# Patient Record
Sex: FEMALE | NOT HISPANIC OR LATINO | URBAN - METROPOLITAN AREA
[De-identification: names, ages, dates, MRNs, and addresses within clinical notes are randomized per-mention and may not be internally consistent; named-entity substitution may affect disease eponyms.]

---

## 2020-08-22 ENCOUNTER — TRANSCRIBE ORDERS (OUTPATIENT)
Dept: ADMINISTRATIVE | Facility: HOSPITAL | Age: 55
End: 2020-08-22

## 2020-08-22 DIAGNOSIS — R73.03 DIABETES MELLITUS, LATENT: ICD-10-CM

## 2020-08-22 DIAGNOSIS — Z12.11 SPECIAL SCREENING FOR MALIGNANT NEOPLASMS, COLON: Primary | ICD-10-CM

## 2020-08-22 DIAGNOSIS — Z13.220 SCREENING FOR LIPOID DISORDERS: ICD-10-CM

## 2023-09-25 ENCOUNTER — HOSPITAL ENCOUNTER (EMERGENCY)
Facility: HOSPITAL | Age: 58
Discharge: HOME | End: 2023-09-25
Attending: EMERGENCY MEDICINE
Payer: COMMERCIAL

## 2023-09-25 ENCOUNTER — APPOINTMENT (EMERGENCY)
Dept: CT IMAGING | Facility: HOSPITAL | Age: 58
End: 2023-09-25
Payer: COMMERCIAL

## 2023-09-25 VITALS
WEIGHT: 160 LBS | DIASTOLIC BLOOD PRESSURE: 64 MMHG | HEIGHT: 65 IN | HEART RATE: 72 BPM | RESPIRATION RATE: 20 BRPM | SYSTOLIC BLOOD PRESSURE: 130 MMHG | BODY MASS INDEX: 26.66 KG/M2 | TEMPERATURE: 98 F | OXYGEN SATURATION: 96 %

## 2023-09-25 DIAGNOSIS — M54.50 LOW BACK PAIN: ICD-10-CM

## 2023-09-25 DIAGNOSIS — N12 PYELONEPHRITIS: Primary | ICD-10-CM

## 2023-09-25 LAB
ALBUMIN SERPL BCP-MCNC: 4.1 G/DL (ref 3.5–5)
ALP SERPL-CCNC: 82 U/L (ref 34–104)
ALT SERPL W P-5'-P-CCNC: 23 U/L (ref 7–52)
ANION GAP SERPL CALCULATED.3IONS-SCNC: 7 MMOL/L
AST SERPL W P-5'-P-CCNC: 29 U/L (ref 13–39)
BACTERIA UR QL AUTO: ABNORMAL /HPF
BASOPHILS # BLD AUTO: 0.07 THOUSANDS/ÂΜL (ref 0–0.1)
BASOPHILS NFR BLD AUTO: 1 % (ref 0–1)
BILIRUB SERPL-MCNC: 0.51 MG/DL (ref 0.2–1)
BILIRUB UR QL STRIP: NEGATIVE
BUN SERPL-MCNC: 9 MG/DL (ref 5–25)
CALCIUM SERPL-MCNC: 9.4 MG/DL (ref 8.4–10.2)
CHLORIDE SERPL-SCNC: 109 MMOL/L (ref 96–108)
CLARITY UR: CLEAR
CO2 SERPL-SCNC: 22 MMOL/L (ref 21–32)
COLOR UR: YELLOW
CREAT SERPL-MCNC: 0.77 MG/DL (ref 0.6–1.3)
EOSINOPHIL # BLD AUTO: 0.07 THOUSAND/ÂΜL (ref 0–0.61)
EOSINOPHIL NFR BLD AUTO: 1 % (ref 0–6)
ERYTHROCYTE [DISTWIDTH] IN BLOOD BY AUTOMATED COUNT: 12.9 % (ref 11.6–15.1)
GFR SERPL CREATININE-BSD FRML MDRD: 84 ML/MIN/1.73SQ M
GLUCOSE SERPL-MCNC: 127 MG/DL (ref 65–140)
GLUCOSE UR STRIP-MCNC: NEGATIVE MG/DL
HCT VFR BLD AUTO: 37.5 % (ref 34.8–46.1)
HGB BLD-MCNC: 13.1 G/DL (ref 11.5–15.4)
HGB UR QL STRIP.AUTO: ABNORMAL
IMM GRANULOCYTES # BLD AUTO: 0.03 THOUSAND/UL (ref 0–0.2)
IMM GRANULOCYTES NFR BLD AUTO: 0 % (ref 0–2)
KETONES UR STRIP-MCNC: NEGATIVE MG/DL
LEUKOCYTE ESTERASE UR QL STRIP: ABNORMAL
LYMPHOCYTES # BLD AUTO: 1.49 THOUSANDS/ÂΜL (ref 0.6–4.47)
LYMPHOCYTES NFR BLD AUTO: 18 % (ref 14–44)
MCH RBC QN AUTO: 31.3 PG (ref 26.8–34.3)
MCHC RBC AUTO-ENTMCNC: 34.9 G/DL (ref 31.4–37.4)
MCV RBC AUTO: 90 FL (ref 82–98)
MONOCYTES # BLD AUTO: 0.42 THOUSAND/ÂΜL (ref 0.17–1.22)
MONOCYTES NFR BLD AUTO: 5 % (ref 4–12)
NEUTROPHILS # BLD AUTO: 6.14 THOUSANDS/ÂΜL (ref 1.85–7.62)
NEUTS SEG NFR BLD AUTO: 75 % (ref 43–75)
NITRITE UR QL STRIP: NEGATIVE
NON-SQ EPI CELLS URNS QL MICRO: ABNORMAL /HPF
NRBC BLD AUTO-RTO: 0 /100 WBCS
PH UR STRIP.AUTO: 6 [PH]
PLATELET # BLD AUTO: 272 THOUSANDS/UL (ref 149–390)
PMV BLD AUTO: 10.5 FL (ref 8.9–12.7)
POTASSIUM SERPL-SCNC: 4.7 MMOL/L (ref 3.5–5.3)
PROT SERPL-MCNC: 7.4 G/DL (ref 6.4–8.4)
PROT UR STRIP-MCNC: NEGATIVE MG/DL
RBC # BLD AUTO: 4.19 MILLION/UL (ref 3.81–5.12)
RBC #/AREA URNS AUTO: ABNORMAL /HPF
SODIUM SERPL-SCNC: 138 MMOL/L (ref 135–147)
SP GR UR STRIP.AUTO: 1.02 (ref 1–1.03)
UROBILINOGEN UR STRIP-ACNC: <2 MG/DL
WBC # BLD AUTO: 8.22 THOUSAND/UL (ref 4.31–10.16)
WBC #/AREA URNS AUTO: ABNORMAL /HPF

## 2023-09-25 PROCEDURE — 96372 THER/PROPH/DIAG INJ SC/IM: CPT

## 2023-09-25 PROCEDURE — 74177 CT ABD & PELVIS W/CONTRAST: CPT

## 2023-09-25 PROCEDURE — 36415 COLL VENOUS BLD VENIPUNCTURE: CPT

## 2023-09-25 PROCEDURE — 85025 COMPLETE CBC W/AUTO DIFF WBC: CPT

## 2023-09-25 PROCEDURE — 99284 EMERGENCY DEPT VISIT MOD MDM: CPT | Performed by: EMERGENCY MEDICINE

## 2023-09-25 PROCEDURE — 80053 COMPREHEN METABOLIC PANEL: CPT

## 2023-09-25 PROCEDURE — 81001 URINALYSIS AUTO W/SCOPE: CPT

## 2023-09-25 PROCEDURE — 99283 EMERGENCY DEPT VISIT LOW MDM: CPT

## 2023-09-25 RX ORDER — CEFPODOXIME PROXETIL 200 MG/1
200 TABLET, FILM COATED ORAL 2 TIMES DAILY
Qty: 20 TABLET | Refills: 0 | Status: SHIPPED | OUTPATIENT
Start: 2023-09-25 | End: 2023-10-05

## 2023-09-25 RX ORDER — LIDOCAINE 50 MG/G
1 PATCH TOPICAL ONCE
Status: DISCONTINUED | OUTPATIENT
Start: 2023-09-25 | End: 2023-09-25 | Stop reason: HOSPADM

## 2023-09-25 RX ORDER — KETOROLAC TROMETHAMINE 30 MG/ML
15 INJECTION, SOLUTION INTRAMUSCULAR; INTRAVENOUS ONCE
Status: COMPLETED | OUTPATIENT
Start: 2023-09-25 | End: 2023-09-25

## 2023-09-25 RX ORDER — LIDOCAINE 50 MG/G
1 PATCH TOPICAL DAILY
Qty: 5 PATCH | Refills: 0 | Status: SHIPPED | OUTPATIENT
Start: 2023-09-25

## 2023-09-25 RX ORDER — METHOCARBAMOL 500 MG/1
500 TABLET, FILM COATED ORAL 2 TIMES DAILY
Qty: 10 TABLET | Refills: 0 | Status: SHIPPED | OUTPATIENT
Start: 2023-09-25

## 2023-09-25 RX ORDER — CEFPODOXIME PROXETIL 200 MG/1
200 TABLET, FILM COATED ORAL 2 TIMES DAILY WITH MEALS
Status: DISCONTINUED | OUTPATIENT
Start: 2023-09-25 | End: 2023-09-25 | Stop reason: HOSPADM

## 2023-09-25 RX ORDER — CEFPODOXIME PROXETIL 200 MG/1
200 TABLET, FILM COATED ORAL 2 TIMES DAILY WITH MEALS
Status: DISCONTINUED | OUTPATIENT
Start: 2023-09-25 | End: 2023-09-25

## 2023-09-25 RX ORDER — METHOCARBAMOL 500 MG/1
500 TABLET, FILM COATED ORAL ONCE
Status: COMPLETED | OUTPATIENT
Start: 2023-09-25 | End: 2023-09-25

## 2023-09-25 RX ADMIN — IOHEXOL 100 ML: 350 INJECTION, SOLUTION INTRAVENOUS at 12:07

## 2023-09-25 RX ADMIN — LIDOCAINE 1 PATCH: 50 PATCH TOPICAL at 10:00

## 2023-09-25 RX ADMIN — METHOCARBAMOL 500 MG: 500 TABLET ORAL at 10:00

## 2023-09-25 RX ADMIN — CEFPODOXIME PROXETIL 200 MG: 200 TABLET, FILM COATED ORAL at 14:21

## 2023-09-25 RX ADMIN — KETOROLAC TROMETHAMINE 15 MG: 30 INJECTION, SOLUTION INTRAMUSCULAR; INTRAVENOUS at 10:00

## 2023-09-25 NOTE — ED PROVIDER NOTES
History  Chief Complaint   Patient presents with   • Back Pain     Pt presents with chronic back pain but today pain is worse then normal.  Left sided back pain that radiates down into her butt. Denies any n/v/d or urinary symptoms. Patient is a 69-year-old female with a past medical history of chronic left back pain, migraine and prediabetes presented to the emergency department for evaluation of left low back pain. Patient states the pain today is worse than it had been. Patient states the pain began last night. Patient states it does radiate down near her buttock but does not radiate into her buttock or down her leg. Patient states the pain is worse when she moves. Patient states she has tried Excedrin without alleviation. Patient denies any radiation around the abdomen or urinary symptoms. Denies fevers, chills, rash, headache, weakness, dizziness, visual changes, abdominal pain, nausea, vomiting, diarrhea, constipation, chest pain, shortness of breath or difficulty breathing. Does not offer any other concerns or complaints. None       History reviewed. No pertinent past medical history. History reviewed. No pertinent surgical history. History reviewed. No pertinent family history. I have reviewed and agree with the history as documented. E-Cigarette/Vaping   • E-Cigarette Use Never User      E-Cigarette/Vaping Substances     Social History     Tobacco Use   • Smoking status: Never   • Smokeless tobacco: Never   Vaping Use   • Vaping Use: Never used   Substance Use Topics   • Alcohol use: Never   • Drug use: Not Currently       Review of Systems   Constitutional: Negative for chills and fever. HENT: Negative for ear pain and sore throat. Eyes: Negative for pain and visual disturbance. Respiratory: Negative for cough and shortness of breath. Cardiovascular: Negative for chest pain and palpitations. Gastrointestinal: Negative for abdominal pain and vomiting. Genitourinary: Negative for dysuria and hematuria. Musculoskeletal: Positive for back pain. Negative for arthralgias. Skin: Negative for color change and rash. Neurological: Negative for seizures and syncope. All other systems reviewed and are negative. Physical Exam  Physical Exam  Vitals and nursing note reviewed. Constitutional:       General: She is not in acute distress. Appearance: Normal appearance. She is well-developed. She is not toxic-appearing or diaphoretic. HENT:      Head: Normocephalic and atraumatic. Right Ear: External ear normal.      Left Ear: External ear normal.      Nose: Nose normal.      Mouth/Throat:      Mouth: Mucous membranes are moist.   Eyes:      General: No scleral icterus. Right eye: No discharge. Left eye: No discharge. Conjunctiva/sclera: Conjunctivae normal.   Cardiovascular:      Rate and Rhythm: Normal rate and regular rhythm. Heart sounds: No murmur heard. Pulmonary:      Effort: Pulmonary effort is normal. No respiratory distress. Breath sounds: Normal breath sounds. Abdominal:      Palpations: Abdomen is soft. Tenderness: There is no abdominal tenderness. There is right CVA tenderness. Musculoskeletal:         General: No swelling, deformity or signs of injury. Normal range of motion. Cervical back: Normal range of motion and neck supple. No rigidity. Back:       Comments: No midline tenderness, step-offs or deformities. Skin:     General: Skin is warm and dry. Capillary Refill: Capillary refill takes less than 2 seconds. Coloration: Skin is not jaundiced. Findings: No erythema or rash. Neurological:      General: No focal deficit present. Mental Status: She is alert and oriented to person, place, and time. Mental status is at baseline. Cranial Nerves: No cranial nerve deficit.       Gait: Gait normal.   Psychiatric:         Mood and Affect: Mood normal. Behavior: Behavior normal.         Thought Content:  Thought content normal.         Judgment: Judgment normal.         Vital Signs  ED Triage Vitals   Temperature Pulse Respirations Blood Pressure SpO2   09/25/23 0942 09/25/23 0942 09/25/23 0942 09/25/23 0942 09/25/23 0942   (!) 97.4 °F (36.3 °C) (!) 132 22 125/88 100 %      Temp Source Heart Rate Source Patient Position - Orthostatic VS BP Location FiO2 (%)   09/25/23 0942 09/25/23 0942 09/25/23 0942 09/25/23 0942 --   Temporal Monitor Sitting Right arm       Pain Score       09/25/23 1000       10 - Worst Possible Pain           Vitals:    09/25/23 1239 09/25/23 1300 09/25/23 1400 09/25/23 1422   BP: 117/67 139/75 132/60 130/64   Pulse: 68 71 76 72   Patient Position - Orthostatic VS: Lying  Lying Sitting         Visual Acuity      ED Medications  Medications   methocarbamol (ROBAXIN) tablet 500 mg (500 mg Oral Given 9/25/23 1000)   ketorolac (TORADOL) injection 15 mg (15 mg Intramuscular Given 9/25/23 1000)   iohexol (OMNIPAQUE) 350 MG/ML injection (MULTI-DOSE) 100 mL (100 mL Intravenous Given 9/25/23 1207)       Diagnostic Studies  Results Reviewed     Procedure Component Value Units Date/Time    Comprehensive metabolic panel [[id number]]  (Abnormal) Collected: 09/25/23 1120    Lab Status: Final result Specimen: Blood from Arm, Right Updated: 09/25/23 1155     Sodium 138 mmol/L      Potassium 4.7 mmol/L      Chloride 109 mmol/L      CO2 22 mmol/L      ANION GAP 7 mmol/L      BUN 9 mg/dL      Creatinine 0.77 mg/dL      Glucose 127 mg/dL      Calcium 9.4 mg/dL      AST 29 U/L      ALT 23 U/L      Alkaline Phosphatase 82 U/L      Total Protein 7.4 g/dL      Albumin 4.1 g/dL      Total Bilirubin 0.51 mg/dL      eGFR 84 ml/min/1.73sq m     Narrative:      [facility] (CKD):   •  Stage 1 with normal or high GFR (GFR > 90 mL/min/1.73 square meters)  •  Stage 2 Mild CKD (GFR = 60-89 mL/min/1.73 square meters)  •  Stage 3A Moderate CKD (GFR = 45-59 mL/min/1.73 square meters)  •  Stage 3B Moderate CKD (GFR = 30-44 mL/min/1.73 square meters)  •  Stage 4 Severe CKD (GFR = 15-29 mL/min/1.73 square meters)  •  Stage 5 End Stage CKD (GFR <15 mL/min/1.73 square meters)  Note: GFR calculation is accurate only with a steady state creatinine    CBC and differential [[id number]] Collected: 09/25/23 1120    Lab Status: Final result Specimen: Blood from Arm, Right Updated: 09/25/23 1126     WBC 8.22 Thousand/uL      RBC 4.19 Million/uL      Hemoglobin 13.1 g/dL      Hematocrit 37.5 %      MCV 90 fL      MCH 31.3 pg      MCHC 34.9 g/dL      RDW 12.9 %      MPV 10.5 fL      Platelets 986 Thousands/uL      nRBC 0 /100 WBCs      Neutrophils Relative 75 %      Immat GRANS % 0 %      Lymphocytes Relative 18 %      Monocytes Relative 5 %      Eosinophils Relative 1 %      Basophils Relative 1 %      Neutrophils Absolute 6.14 Thousands/µL      Immature Grans Absolute 0.03 Thousand/uL      Lymphocytes Absolute 1.49 Thousands/µL      Monocytes Absolute 0.42 Thousand/µL      Eosinophils Absolute 0.07 Thousand/µL      Basophils Absolute 0.07 Thousands/µL     Urine Microscopic [[id number]]  (Abnormal) Collected: 09/25/23 1037    Lab Status: Final result Specimen: Urine, Clean Catch Updated: 09/25/23 1108     RBC, UA 2-4 /hpf      WBC, UA 4-10 /hpf      Epithelial Cells Occasional /hpf      Bacteria, UA Occasional /hpf     UA w Reflex to Microscopic w Reflex to Culture [[id number]]  (Abnormal) Collected: 09/25/23 1037    Lab Status: Final result Specimen: Urine, Clean Catch Updated: 09/25/23 1055     Color, UA Yellow     Clarity, UA Clear     Specific Gravity, UA 1.020     pH, UA 6.0     Leukocytes, UA Moderate     Nitrite, UA Negative     Protein, UA Negative mg/dl      Glucose, UA Negative mg/dl      Ketones, UA Negative mg/dl      Urobilinogen, UA <2.0 mg/dl      Bilirubin, UA Negative     Occult Blood, UA Trace                 CT abdomen pelvis with contrast   Final Result by Lissette Reyes MD (09/25 9155)   No obstructing calculus   No bowel obstruction   Mild prominence of the renal pelvises and ureters without any obstructing calculus            Workstation performed: [id number]         CT recon only lumbar spine   Final Result by Lissette Reyes MD (09/25 6764)      No acute compression collapse of the vetebra   Broad-based central protrusion at L4-5 with facet joint disease and moderate central canal narrowing. Disc bulge at L3-4 with the right subarticular recess protrusion with impingement of the traversing right L4 nerve root and moderate central canal narrowing. Workstation performed: [id number]                    Procedures  Procedures         ED Course             SBIRT 22yo+    Flowsheet Row Most Recent Value   Initial Alcohol Screen: US AUDIT-C     1. How often do you have a drink containing alcohol? 0 Filed at: 09/25/2023 1234   2. How many drinks containing alcohol do you have on a typical day you are drinking? 0 Filed at: 09/25/2023 1234   3b. FEMALE Any Age, or MALE 65+: How often do you have 4 or more drinks on one occassion? 0 Filed at: 09/25/2023 1234   Audit-C Score 0 Filed at: 09/25/2023 1234   MYRTLE: How many times in the past year have you. .. Used an illegal drug or used a prescription medication for non-medical reasons? Never Filed at: 09/25/2023 1234                    Medical Decision Making    This is a 44-year-old female present to the emergency department for evaluation of back pain. Patient states having chronic left-sided back pain but states this is worse than her chronic pain. Patient denies any recent change in activity or trauma to the area. Patient denies any abdominal pain or urinary symptoms. Patient states the pain is worse when she moves. Denies bladder or bowel habit changes, bladder or bowel incontinence, or saddle anesthesia.   Patient does appear uncomfortable on initial examination, stable vital signs and in no acute distress. Differential diagnosis to include but is not limited to: Musculoskeletal strain, sprain, UTI, pyelonephritis    Initial ED Plan: imaging, labs, UA    ED results:  No obstructing calculus  No bowel obstruction  Mild prominence of the renal pelvises and ureters without any obstructing calculus    No acute compression collapse of the vetebra  Broad-based central protrusion at L4-5 with facet joint disease and moderate central canal narrowing. Disc bulge at L3-4 with the right subarticular recess protrusion with impingement of the traversing right L4 nerve root and moderate central canal narrowing.  -Discussed the above CT scan results with the patient. Patient states she knew about the protrusions and disc bulges. Final ED assessment: Patient is stable and well appearing. Discussed radiologic studies and laboratory results. Discussed antibiotic course for possible pyelonephritis secondary to the location of the pain and the RBCs and WBCs in UA. Discussed follow-up with PCP and orthopedics. Strict return precautions were discussed including but not limited to worsening pain, urinary symptoms, fevers, abdominal pain. Patient verbalized understanding and is agreeable with the plan for discharge. Amount and/or Complexity of Data Reviewed  Labs: ordered. Radiology: ordered. Risk  Prescription drug management. Disposition  Final diagnoses:   Pyelonephritis   Low back pain     Time reflects when diagnosis was documented in both MDM as applicable and the Disposition within this note     Time User Action Codes Description Comment    9/25/2023  1:58 PM Delos Harvey Add [N12] Pyelonephritis     9/25/2023  1:58 PM Delos Harvey Add [M54.50] Low back pain       ED Disposition     ED Disposition   Discharge    Condition   Stable    Date/Time   Mon Sep 25, 2023  1:58 PM    Comment   Olu Blackman discharge to home/self care. Follow-up Information     Follow up With Specialties Details Why Contact Info Additional Information    your PCP  Call in 3 days For follow up      [facility] Go to  If symptoms worsen [address] [facility], [place], [place], [address] [facility] Physical Therapy Call  For follow up           Discharge Medication List as of 9/25/2023  2:02 PM      START taking these medications    Details   cefpodoxime (VANTIN) 200 mg tablet Take 1 tablet (200 mg total) by mouth 2 (two) times a day for 10 days, Starting Mon 9/25/2023, Until Thu 10/5/2023, Normal                 PDMP Review     None          ED Provider  Electronically Signed by           Martinez Lo PA-C  09/26/23 5920

## 2023-09-25 NOTE — DISCHARGE INSTRUCTIONS
Follow up with PCP  Follow up with comprehensive spine  Return to the ED with new or worsening symptoms including but not limited to worsening pain, urinary symptoms, bladder or bowel habit changes, bladder or bowel incontinence, or saddle anesthesia. No acute compression collapse of the vetebra  Broad-based central protrusion at L4-5 with facet joint disease and moderate central canal narrowing. Disc bulge at L3-4 with the right subarticular recess protrusion with impingement of the traversing right L4 nerve root and moderate central canal narrowing.

## 2023-09-27 ENCOUNTER — TELEPHONE (OUTPATIENT)
Dept: PHYSICAL THERAPY | Facility: OTHER | Age: 58
End: 2023-09-27

## 2023-09-27 NOTE — TELEPHONE ENCOUNTER
Call placed to the patient per [facility] referral.    Patient's  Elton Olson answered the call. I left message with  for patient to call back. Phone number and hours of business provided. (no detail were given) No communication consent in epic. This is the 1st attempt to reach the patient. Will defer referral per protocol.

## 2023-10-01 ENCOUNTER — APPOINTMENT (EMERGENCY)
Dept: CT IMAGING | Facility: HOSPITAL | Age: 58
End: 2023-10-01
Payer: COMMERCIAL

## 2023-10-01 ENCOUNTER — HOSPITAL ENCOUNTER (EMERGENCY)
Facility: HOSPITAL | Age: 58
Discharge: HOME | End: 2023-10-01
Attending: EMERGENCY MEDICINE | Admitting: EMERGENCY MEDICINE
Payer: COMMERCIAL

## 2023-10-01 VITALS
HEART RATE: 63 BPM | WEIGHT: 160 LBS | TEMPERATURE: 97.6 F | BODY MASS INDEX: 26.66 KG/M2 | DIASTOLIC BLOOD PRESSURE: 62 MMHG | HEIGHT: 65 IN | RESPIRATION RATE: 16 BRPM | OXYGEN SATURATION: 100 % | SYSTOLIC BLOOD PRESSURE: 122 MMHG

## 2023-10-01 DIAGNOSIS — S39.012A LOW BACK STRAIN, INITIAL ENCOUNTER: Primary | ICD-10-CM

## 2023-10-01 DIAGNOSIS — M54.6 LEFT-SIDED THORACIC BACK PAIN: ICD-10-CM

## 2023-10-01 LAB
ALBUMIN SERPL BCP-MCNC: 4.7 G/DL (ref 3.5–5)
ALP SERPL-CCNC: 99 U/L (ref 34–104)
ALT SERPL W P-5'-P-CCNC: 19 U/L (ref 7–52)
ANION GAP SERPL CALCULATED.3IONS-SCNC: 11 MMOL/L
AST SERPL W P-5'-P-CCNC: 18 U/L (ref 13–39)
BACTERIA UR QL AUTO: ABNORMAL /HPF
BASOPHILS # BLD AUTO: 0.07 THOUSANDS/ÂΜL (ref 0–0.1)
BASOPHILS NFR BLD AUTO: 1 % (ref 0–1)
BILIRUB DIRECT SERPL-MCNC: 0.08 MG/DL (ref 0–0.2)
BILIRUB SERPL-MCNC: 0.42 MG/DL (ref 0.2–1)
BILIRUB UR QL STRIP: NEGATIVE
BUN SERPL-MCNC: 12 MG/DL (ref 5–25)
CALCIUM SERPL-MCNC: 9.8 MG/DL (ref 8.4–10.2)
CHLORIDE SERPL-SCNC: 108 MMOL/L (ref 96–108)
CLARITY UR: CLEAR
CO2 SERPL-SCNC: 22 MMOL/L (ref 21–32)
COLOR UR: COLORLESS
CREAT SERPL-MCNC: 0.79 MG/DL (ref 0.6–1.3)
EOSINOPHIL # BLD AUTO: 0.05 THOUSAND/ÂΜL (ref 0–0.61)
EOSINOPHIL NFR BLD AUTO: 1 % (ref 0–6)
ERYTHROCYTE [DISTWIDTH] IN BLOOD BY AUTOMATED COUNT: 12.8 % (ref 11.6–15.1)
GFR SERPL CREATININE-BSD FRML MDRD: 82 ML/MIN/1.73SQ M
GLUCOSE SERPL-MCNC: 93 MG/DL (ref 65–140)
GLUCOSE UR STRIP-MCNC: NEGATIVE MG/DL
HCT VFR BLD AUTO: 42.7 % (ref 34.8–46.1)
HGB BLD-MCNC: 14.5 G/DL (ref 11.5–15.4)
HGB UR QL STRIP.AUTO: NEGATIVE
IMM GRANULOCYTES # BLD AUTO: 0.01 THOUSAND/UL (ref 0–0.2)
IMM GRANULOCYTES NFR BLD AUTO: 0 % (ref 0–2)
KETONES UR STRIP-MCNC: NEGATIVE MG/DL
LEUKOCYTE ESTERASE UR QL STRIP: ABNORMAL
LIPASE SERPL-CCNC: 34 U/L (ref 11–82)
LYMPHOCYTES # BLD AUTO: 2.87 THOUSANDS/ÂΜL (ref 0.6–4.47)
LYMPHOCYTES NFR BLD AUTO: 29 % (ref 14–44)
MCH RBC QN AUTO: 31.4 PG (ref 26.8–34.3)
MCHC RBC AUTO-ENTMCNC: 34 G/DL (ref 31.4–37.4)
MCV RBC AUTO: 92 FL (ref 82–98)
MONOCYTES # BLD AUTO: 0.64 THOUSAND/ÂΜL (ref 0.17–1.22)
MONOCYTES NFR BLD AUTO: 7 % (ref 4–12)
NEUTROPHILS # BLD AUTO: 6.14 THOUSANDS/ÂΜL (ref 1.85–7.62)
NEUTS SEG NFR BLD AUTO: 62 % (ref 43–75)
NITRITE UR QL STRIP: NEGATIVE
NON-SQ EPI CELLS URNS QL MICRO: ABNORMAL /HPF
NRBC BLD AUTO-RTO: 0 /100 WBCS
PH UR STRIP.AUTO: 7.5 [PH]
PLATELET # BLD AUTO: 287 THOUSANDS/UL (ref 149–390)
PMV BLD AUTO: 10.6 FL (ref 8.9–12.7)
POTASSIUM SERPL-SCNC: 3.7 MMOL/L (ref 3.5–5.3)
PROT SERPL-MCNC: 8.7 G/DL (ref 6.4–8.4)
PROT UR STRIP-MCNC: NEGATIVE MG/DL
RBC # BLD AUTO: 4.62 MILLION/UL (ref 3.81–5.12)
RBC #/AREA URNS AUTO: ABNORMAL /HPF
SODIUM SERPL-SCNC: 141 MMOL/L (ref 135–147)
SP GR UR STRIP.AUTO: 1 (ref 1–1.03)
UROBILINOGEN UR STRIP-ACNC: <2 MG/DL
WBC # BLD AUTO: 9.78 THOUSAND/UL (ref 4.31–10.16)
WBC #/AREA URNS AUTO: ABNORMAL /HPF

## 2023-10-01 PROCEDURE — 74177 CT ABD & PELVIS W/CONTRAST: CPT

## 2023-10-01 PROCEDURE — 80048 BASIC METABOLIC PNL TOTAL CA: CPT | Performed by: EMERGENCY MEDICINE

## 2023-10-01 PROCEDURE — 99285 EMERGENCY DEPT VISIT HI MDM: CPT | Performed by: EMERGENCY MEDICINE

## 2023-10-01 PROCEDURE — 99283 EMERGENCY DEPT VISIT LOW MDM: CPT

## 2023-10-01 PROCEDURE — 96374 THER/PROPH/DIAG INJ IV PUSH: CPT

## 2023-10-01 PROCEDURE — 96372 THER/PROPH/DIAG INJ SC/IM: CPT

## 2023-10-01 PROCEDURE — 81001 URINALYSIS AUTO W/SCOPE: CPT | Performed by: EMERGENCY MEDICINE

## 2023-10-01 PROCEDURE — 36415 COLL VENOUS BLD VENIPUNCTURE: CPT | Performed by: EMERGENCY MEDICINE

## 2023-10-01 PROCEDURE — 83690 ASSAY OF LIPASE: CPT | Performed by: EMERGENCY MEDICINE

## 2023-10-01 PROCEDURE — 85025 COMPLETE CBC W/AUTO DIFF WBC: CPT | Performed by: EMERGENCY MEDICINE

## 2023-10-01 PROCEDURE — 80076 HEPATIC FUNCTION PANEL: CPT | Performed by: EMERGENCY MEDICINE

## 2023-10-01 RX ORDER — CYCLOBENZAPRINE HCL 10 MG
10 TABLET ORAL 2 TIMES DAILY PRN
Qty: 20 TABLET | Refills: 0 | Status: CANCELLED | OUTPATIENT
Start: 2023-10-01

## 2023-10-01 RX ORDER — NAPROXEN 500 MG/1
500 TABLET ORAL EVERY 12 HOURS PRN
Qty: 20 TABLET | Refills: 0 | Status: SHIPPED | OUTPATIENT
Start: 2023-10-01

## 2023-10-01 RX ORDER — LIDOCAINE 50 MG/G
1 PATCH TOPICAL ONCE
Status: DISCONTINUED | OUTPATIENT
Start: 2023-10-01 | End: 2023-10-01 | Stop reason: HOSPADM

## 2023-10-01 RX ORDER — ACETAMINOPHEN 325 MG/1
650 TABLET ORAL ONCE
Status: COMPLETED | OUTPATIENT
Start: 2023-10-01 | End: 2023-10-01

## 2023-10-01 RX ORDER — METHOCARBAMOL 500 MG/1
500 TABLET, FILM COATED ORAL ONCE
Status: DISCONTINUED | OUTPATIENT
Start: 2023-10-01 | End: 2023-10-01

## 2023-10-01 RX ORDER — MORPHINE SULFATE 4 MG/ML
4 INJECTION, SOLUTION INTRAMUSCULAR; INTRAVENOUS ONCE
Status: COMPLETED | OUTPATIENT
Start: 2023-10-01 | End: 2023-10-01

## 2023-10-01 RX ORDER — KETOROLAC TROMETHAMINE 30 MG/ML
30 INJECTION, SOLUTION INTRAMUSCULAR; INTRAVENOUS ONCE
Status: COMPLETED | OUTPATIENT
Start: 2023-10-01 | End: 2023-10-01

## 2023-10-01 RX ORDER — CYCLOBENZAPRINE HCL 10 MG
10 TABLET ORAL ONCE
Status: COMPLETED | OUTPATIENT
Start: 2023-10-01 | End: 2023-10-01

## 2023-10-01 RX ADMIN — ACETAMINOPHEN 650 MG: 325 TABLET, FILM COATED ORAL at 15:33

## 2023-10-01 RX ADMIN — LIDOCAINE 1 PATCH: 50 PATCH CUTANEOUS at 15:34

## 2023-10-01 RX ADMIN — IOHEXOL 100 ML: 350 INJECTION, SOLUTION INTRAVENOUS at 18:48

## 2023-10-01 RX ADMIN — CYCLOBENZAPRINE HYDROCHLORIDE 10 MG: 10 TABLET, FILM COATED ORAL at 15:33

## 2023-10-01 RX ADMIN — KETOROLAC TROMETHAMINE 30 MG: 30 INJECTION INTRAMUSCULAR; INTRAVENOUS at 15:34

## 2023-10-01 RX ADMIN — MORPHINE SULFATE 4 MG: 4 INJECTION INTRAVENOUS at 17:57

## 2023-10-01 NOTE — ED PROVIDER NOTES
History  Chief Complaint   Patient presents with   • Back Pain     Patient c/o lower back pain since last Monday, previously seen here for the same thing. Patient is a 58-year-old female with no significant past medical or surgical history, presents to the emergency department complaining of left mid to low back pain. Patient states her pain started on Monday. Patient reports she does have chronic back pain but this pain feels different. She states she was evaluated in the ED on Monday 9/25 and according to medical records, she had CBC, CMP both of which were unremarkable. UA showed 4-10 WBCs, 2-4 RBCs, occasional epithelial cells and occasional bacteria. She had CT abdomen and pelvis which showed "Mild prominence of the renal pelvises and ureters without any obstructing calculus" but no other acute abnormality. CT lumbar spine recon imaging was also done on 9/25 showing the following: "No acute compression collapse of the vetebrae. Broad-based central protrusion at L4-5 with facet joint disease and moderate central canal narrowing. Disc bulge at L3-4 with the right subarticular recess protrusion with impingement of the traversing right L4 nerve root and moderate central canal narrowing."  She was sent home with antibiotic to cover for pyelonephritis as well as a muscle relaxer but she states the muscle relaxer has not been helping. She has a few days left of the antibiotic which was given to her for a total of 10 days. She states her pain was getting better and she felt good yesterday but it got acutely worse again today. She admits to resuming normal daily activities yesterday and was walking and doing chores. No known injury. She denies any radiation of the pain and localizes it to the left flank region.   She denies any associated fevers or chills, headache, dizziness or near syncope, neck pain or stiffness, cough, URI symptoms, chest pain, shortness of breath, palpitations, abdominal pain or distention, nausea, vomiting, change in bowel habits, blood per rectum or melena, dysuria, change in urinary frequency, gross hematuria, skin rash or color change, extremity weakness or paresthesia, extremity swelling or pain, other focal neurologic deficits. History provided by:  Patient and medical records   used: No    Back Pain  Associated symptoms: no abdominal pain, no chest pain, no dysuria, no fever, no headaches, no numbness and no weakness        Prior to Admission Medications   Prescriptions Last Dose Informant Patient Reported? Taking? cefpodoxime (VANTIN) 200 mg tablet   No No   Sig: Take 1 tablet (200 mg total) by mouth 2 (two) times a day for 10 days   lidocaine (Lidoderm) 5 %   No No   Sig: Apply 1 patch topically over 12 hours daily Remove & Discard patch within 12 hours or as directed by MD   methocarbamol (ROBAXIN) 500 mg tablet   No No   Sig: Take 1 tablet (500 mg total) by mouth 2 (two) times a day      Facility-Administered Medications: None       No past medical history on file. No past surgical history on file. No family history on file. I have reviewed and agree with the history as documented. E-Cigarette/Vaping   • E-Cigarette Use Never User      E-Cigarette/Vaping Substances     Social History     Tobacco Use   • Smoking status: Never   • Smokeless tobacco: Never   Vaping Use   • Vaping Use: Never used   Substance Use Topics   • Alcohol use: Never   • Drug use: Not Currently       Review of Systems   Constitutional: Negative for chills and fever. HENT: Negative for congestion, ear pain, rhinorrhea and sore throat. Respiratory: Negative for cough, chest tightness, shortness of breath and wheezing. Cardiovascular: Negative for chest pain and palpitations. Gastrointestinal: Negative for abdominal pain, constipation, diarrhea, nausea and vomiting. Genitourinary: Positive for flank pain.  Negative for difficulty urinating, dysuria, frequency and hematuria. Musculoskeletal: Positive for back pain. Negative for neck pain and neck stiffness. Skin: Negative for color change, pallor, rash and wound. Allergic/Immunologic: Negative for immunocompromised state. Neurological: Negative for dizziness, syncope, weakness, light-headedness, numbness and headaches. Hematological: Negative for adenopathy. Does not bruise/bleed easily. Psychiatric/Behavioral: Negative for confusion and decreased concentration. All other systems reviewed and are negative. Physical Exam  Physical Exam  Vitals and nursing note reviewed. Constitutional:       General: She is not in acute distress. Appearance: Normal appearance. She is well-developed. She is not ill-appearing, toxic-appearing or diaphoretic. HENT:      Head: Normocephalic and atraumatic. Right Ear: External ear normal.      Left Ear: External ear normal.      Mouth/Throat:      Mouth: Mucous membranes are moist.      Pharynx: Oropharynx is clear. Eyes:      Extraocular Movements: Extraocular movements intact. Conjunctiva/sclera: Conjunctivae normal.   Neck:      Vascular: No JVD. Cardiovascular:      Rate and Rhythm: Normal rate and regular rhythm. Pulses: Normal pulses. Heart sounds: Normal heart sounds. No murmur heard. No friction rub. No gallop. Pulmonary:      Effort: Pulmonary effort is normal. No respiratory distress. Breath sounds: Normal breath sounds. No wheezing, rhonchi or rales. Abdominal:      General: There is no distension. Palpations: Abdomen is soft. Tenderness: There is no abdominal tenderness. There is no right CVA tenderness, left CVA tenderness, guarding or rebound. Musculoskeletal:         General: Tenderness present. No swelling. Normal range of motion. Cervical back: Normal range of motion and neck supple. No rigidity. Comments: +Left thoracolumbar paravertebral muscle tenderness and hypertonicity.   No midline cervical, thoracic or lumbar spine pain. No overlying skin changes on the back. Skin:     General: Skin is warm and dry. Coloration: Skin is not pale. Findings: No erythema or rash. Neurological:      General: No focal deficit present. Mental Status: She is alert and oriented to person, place, and time. Sensory: No sensory deficit. Motor: No weakness.    Psychiatric:         Mood and Affect: Mood normal.         Behavior: Behavior normal.         Vital Signs  ED Triage Vitals [10/01/23 1413]   Temperature Pulse Respirations Blood Pressure SpO2   97.6 °F (36.4 °C) (!) 110 20 (!) 185/72 100 %      Temp Source Heart Rate Source Patient Position - Orthostatic VS BP Location FiO2 (%)   Temporal Monitor Sitting Left arm --      Pain Score       --         Vitals:    10/01/23 1413 10/01/23 1500 10/01/23 1925   BP: (!) 185/72 106/53 122/62   BP Location: Left arm Left arm Right arm   Pulse: (!) 110 75 63   Resp: 20 20 16   Temp: 97.6 °F (36.4 °C)     TempSrc: Temporal     SpO2: 100% 99% 100%   Weight: 72.6 kg (160 lb)     Height: 5' 5" (1.651 m)         Visual Acuity      ED Medications  Medications   lidocaine (LIDODERM) 5 % patch 1 patch (1 patch Topical Medication Applied 10/1/23 1534)   ketorolac (TORADOL) injection 30 mg (30 mg Intramuscular Given 10/1/23 1534)   acetaminophen (TYLENOL) tablet 650 mg (650 mg Oral Given 10/1/23 1533)   cyclobenzaprine (FLEXERIL) tablet 10 mg (10 mg Oral Given 10/1/23 1533)   morphine injection 4 mg (4 mg Intravenous Given 10/1/23 1757)   iohexol (OMNIPAQUE) 350 MG/ML injection (MULTI-DOSE) 100 mL (100 mL Intravenous Given 10/1/23 1848)       Diagnostic Studies  Results Reviewed     Procedure Component Value Units Date/Time    Basic metabolic panel [[id number]] Collected: 10/01/23 1755    Lab Status: Final result Specimen: Blood from Arm, Left Updated: 10/01/23 1816     Sodium 141 mmol/L      Potassium 3.7 mmol/L      Chloride 108 mmol/L      CO2 22 mmol/L      ANION GAP 11 mmol/L      BUN 12 mg/dL      Creatinine 0.79 mg/dL      Glucose 93 mg/dL      Calcium 9.8 mg/dL      eGFR 82 ml/min/1.73sq m     Narrative:      [facility] guidelines for Chronic Kidney Disease (CKD):   •  Stage 1 with normal or high GFR (GFR > 90 mL/min/1.73 square meters)  •  Stage 2 Mild CKD (GFR = 60-89 mL/min/1.73 square meters)  •  Stage 3A Moderate CKD (GFR = 45-59 mL/min/1.73 square meters)  •  Stage 3B Moderate CKD (GFR = 30-44 mL/min/1.73 square meters)  •  Stage 4 Severe CKD (GFR = 15-29 mL/min/1.73 square meters)  •  Stage 5 End Stage CKD (GFR <15 mL/min/1.73 square meters)  Note: GFR calculation is accurate only with a steady state creatinine    Hepatic function panel [[id number]]  (Abnormal) Collected: 10/01/23 1755    Lab Status: Final result Specimen: Blood from Arm, Left Updated: 10/01/23 1816     Total Bilirubin 0.42 mg/dL      Bilirubin, Direct 0.08 mg/dL      Alkaline Phosphatase 99 U/L      AST 18 U/L      ALT 19 U/L      Total Protein 8.7 g/dL      Albumin 4.7 g/dL     Lipase [[id number]]  (Normal) Collected: 10/01/23 1755    Lab Status: Final result Specimen: Blood from Arm, Left Updated: 10/01/23 1816     Lipase 34 u/L     CBC and differential [[id number]] Collected: 10/01/23 1755    Lab Status: Final result Specimen: Blood from Arm, Left Updated: 10/01/23 1758     WBC 9.78 Thousand/uL      RBC 4.62 Million/uL      Hemoglobin 14.5 g/dL      Hematocrit 42.7 %      MCV 92 fL      MCH 31.4 pg      MCHC 34.0 g/dL      RDW 12.8 %      MPV 10.6 fL      Platelets 989 Thousands/uL      nRBC 0 /100 WBCs      Neutrophils Relative 62 %      Immat GRANS % 0 %      Lymphocytes Relative 29 %      Monocytes Relative 7 %      Eosinophils Relative 1 %      Basophils Relative 1 %      Neutrophils Absolute 6.14 Thousands/µL      Immature Grans Absolute 0.01 Thousand/uL      Lymphocytes Absolute 2.87 Thousands/µL      Monocytes Absolute 0.64 Thousand/µL      Eosinophils Absolute 0.05 Thousand/µL      Basophils Absolute 0.07 Thousands/µL     Urine Microscopic [[id number]]  (Abnormal) Collected: 10/01/23 1638    Lab Status: Final result Specimen: Urine, Other Updated: 10/01/23 1650     RBC, UA None Seen /hpf      WBC, UA 2-4 /hpf      Epithelial Cells Occasional /hpf      Bacteria, UA Occasional /hpf     UA w Reflex to Microscopic w Reflex to Culture [[id number]]  (Abnormal) Collected: 10/01/23 1638    Lab Status: Final result Specimen: Urine, Other Updated: 10/01/23 1649     Color, UA Colorless     Clarity, UA Clear     Specific Gravity, UA 1.005     pH, UA 7.5     Leukocytes, UA Trace     Nitrite, UA Negative     Protein, UA Negative mg/dl      Glucose, UA Negative mg/dl      Ketones, UA Negative mg/dl      Urobilinogen, UA <2.0 mg/dl      Bilirubin, UA Negative     Occult Blood, UA Negative                 CT abdomen pelvis with contrast   Final Result by Brook Collet, MD (10/01 1954)      No acute findings in the abdomen or pelvis. Workstation performed: [id number]                    Procedures  Procedures         ED Course  ED Course as of 10/01/23 2016   Colby Barnes Oct 01, 2023   1702 Patient reassessed and states she has had no improvement in her back pain. Urinalysis is unremarkable. Given she has had no significant improvement I did recommend reimaging her with CT scan of the abdomen and pelvis and patient is agreeable. Will give a dose of IV morphine for further pain relief. 2013 Updated patient about unremarkable work-up including normal blood work, normal CT scan. Suspect musculoskeletal pain and will place a second ambulatory referral to comprehensive spine program.  Discussed symptomatic management at home and when to return to the ER. SBIRT 20yo+    Flowsheet Row Most Recent Value   Initial Alcohol Screen: US AUDIT-C     1. How often do you have a drink containing alcohol? 0 Filed at: 10/01/2023 1413   2.  How many drinks containing alcohol do you have on a typical day you are drinking? 0 Filed at: 10/01/2023 1413   3a. Male UNDER 65: How often do you have five or more drinks on one occasion? 0 Filed at: 10/01/2023 1413   3b. FEMALE Any Age, or MALE 65+: How often do you have 4 or more drinks on one occassion? 0 Filed at: 10/01/2023 1413   Audit-C Score 0 Filed at: 10/01/2023 1413   MYRTLE: How many times in the past year have you. .. Used an illegal drug or used a prescription medication for non-medical reasons? Never Filed at: 10/01/2023 1413                    Medical Decision Making  76-year-old female presents to the ED for 1 week of left mid to low back pain. She was seen in the ED days ago and had a CT scan of the abdomen and pelvis which was unremarkable as well as CT of the lumbar spine which showed disc bulging worse on the right side. Patient symptoms are left-sided. She was discharged home with antibiotic to cover for pyelonephritis however overall low suspicion for acute pyelo or UTI. Suspect this is likely musculoskeletal pain. I did offer repeat blood work and CT imaging but given she has no abdominal pain or tenderness, I feel it is of low utility. Will recheck urinalysis and treat symptomatically with IM Toradol, Tylenol, Flexeril, lidocaine patch. Patient already referred to comprehensive spine program but has yet to hear back so we will place another referral and also recommend close follow-up with PCP. Amount and/or Complexity of Data Reviewed  External Data Reviewed:      Details: Reviewed recent ED visit note from 9/25 as well as recent imaging and blood work results on 9/25. Risk  OTC drugs. Prescription drug management.           Disposition  Final diagnoses:   Low back strain, initial encounter   Left-sided thoracic back pain     Time reflects when diagnosis was documented in both MDM as applicable and the Disposition within this note     Time User Action Codes Description Comment    10/1/2023  8:14 PM [employer] E Add [M54.6] Left-sided thoracic back pain     10/1/2023  8:14 PM Major Spruce Add [S39.012A] Low back strain, initial encounter     10/1/2023  8:14 PM [employer] E Modify [S39.012A] Low back strain, initial encounter     10/1/2023  8:14 PM [employer] E Remove [M54.6] Left-sided thoracic back pain     10/1/2023  8:14 PM Major Spruce Add [M54.6] Left-sided thoracic back pain       ED Disposition     ED Disposition   Discharge    Condition   Stable    Date/Time   Sun Oct 1, 2023  8:15 PM    Comment   Shania Woodruff discharge to home/self care.                Follow-up Information     Follow up With Specialties Details Why Contact Info Additional Information    Family doctor  Schedule an appointment as soon as possible for a visit        [address] [place] [address] Emergency Department Emergency Medicine Go to  If symptoms worsen [address] Emergency Department, [place], [place], [address]          Patient's Medications   Discharge Prescriptions    NAPROXEN (NAPROSYN) 500 MG TABLET    Take 1 tablet (500 mg total) by mouth every 12 (twelve) hours as needed for mild pain or moderate pain       Start Date: 10/1/2023 End Date: --       Order Dose: 500 mg       Quantity: 20 tablet    Refills: 0           PDMP Review     None          ED Provider  Electronically Signed by           Magaly Yo DO  10/01/23 2016

## 2023-10-05 NOTE — TELEPHONE ENCOUNTER
Call placed to the patient per [facility] referral.     V/M left for patient to call back. Phone number and hours of business provided.     This is the 2nd attempt to reach the patient. Will defer referral per protocol.

## 2023-10-19 ENCOUNTER — TELEPHONE (OUTPATIENT)
Age: 58
End: 2023-10-19

## 2023-10-19 ENCOUNTER — NURSE TRIAGE (OUTPATIENT)
Dept: PHYSICAL THERAPY | Facility: OTHER | Age: 58
End: 2023-10-19

## 2023-10-19 DIAGNOSIS — M54.50 ACUTE EXACERBATION OF CHRONIC LOW BACK PAIN: ICD-10-CM

## 2023-10-19 DIAGNOSIS — M54.6 ACUTE LEFT-SIDED THORACIC BACK PAIN: ICD-10-CM

## 2023-10-19 DIAGNOSIS — M54.50 ACUTE LEFT-SIDED LOW BACK PAIN, UNSPECIFIED WHETHER SCIATICA PRESENT: Primary | ICD-10-CM

## 2023-10-19 DIAGNOSIS — G89.29 ACUTE EXACERBATION OF CHRONIC LOW BACK PAIN: ICD-10-CM

## 2023-10-19 NOTE — TELEPHONE ENCOUNTER
Call transferred by Ortho. Patient left v/m @ 11:30am.      Additional Information   Negative: Is this related to a work injury? Negative: Is this related to an MVA? Negative: Are you currently recieving homecare services? Background - Initial Assessment  Clinical complaint: ED visit on 09/25 and 10/01 due to Left-sided Mid Lower Back Pain. Hx of back pain in the past. New flare up started Monday 09/25, it radiates into left leg down to the knee. She feels numbness in her left thigh. Pain is constant "all the time", worse with movement and certain position. MAYRA Was seen by a chiropractor at [facility] in February 2023. Patient has had PT in the past with no relief. Patient described pain as stabbing. CT Lumbar shows: Disc Bulge and protrusion w/ facet joint disease. Date of onset: Monday 09/25/23  Frequency of pain: constant  Quality of pain: numb and stabbing.     Protocols used: [address] Protocol

## 2023-10-19 NOTE — TELEPHONE ENCOUNTER
Additional Information   Negative: Has the patient had unexplained weight loss? Negative: Does the patient have a fever? Negative: Is the patient experiencing blood in sputum? Negative: Is the patient experiencing urine retention? Negative: Is the patient experiencing acute drop foot or paralysis? Negative: Has the patient experienced major trauma? (fall from height, high speed collision, direct blow to spine) and is also experiencing nausea, light-headedness, or loss of consciousness? Negative: Is this a chronic condition? Protocols used: [address] [place] [address] Protocol    The following encounter was completed with RUFINO [address]  ID# [id number] (Stand-by) as patients primary language is Georgian. Patients initial wish to be referred to PM was addressed prior to completing her triage with CSP. This RN briefly reviewed current complaints and worsening start time and prior therapy with LV rehab. Nurse explained the benefits of participating in a thorough evaluation with one of our [facility] therapist/DPT. Emphasized the consult includes going over appropriate tx options and developing a plan of care. Patient agreed and preferred to schedule with the [facility] to assist with moving forward. Nurse completed triage and NO RF s/s present. Referral entered for the [address] [place] [address] site and contact/phone number info given to her as well. Patients information was sent to the preferred site and pt made aware clerical would be calling to schedule appointment. Nurse encouraged her to call site if she does not hear from clerical beforehand. Patient Agreed. Nurse also offered a call from the [facility] counselor d/t SBT score. Patient declined. Patient was pleasant and appropriate during this encounter. Patient did not voice any further additional questions or concerns at this time. Nurse addressed several questions during this encounter while explaining the conservative approach  .   Patient is aware current/PAST complaints, relevant dx, additional referrals and treatment/options will be discussed at the evaluation/consult. All information regarding plan to be evaluated by the A. S. Therapist was reviewed. Patient is in agreement with nurse's explanation of intended care path discussed throughout this encounter. Patient very appreciative of CB and referral placement for the evaluation with an Advanced Spine  Therapist/DPT. REMINDED the patient she will be able to discuss preferences and if deeper care is necessary/recommended. Patient has participated in therapy as mentioned OON. Nurse wished her well and both referrals previously closed. Updated one to reflect today's triage. Patient thanked nurse again for all of the above and honoring Jacey [place] request with .

## 2023-10-19 NOTE — TELEPHONE ENCOUNTER
Caller: Patient    Doctor: [facility]    Reason for call:     Transferred to [facility]    Call back#: n/a

## 2023-10-24 ENCOUNTER — EVALUATION (OUTPATIENT)
Dept: PHYSICAL THERAPY | Facility: CLINIC | Age: 58
End: 2023-10-24
Payer: COMMERCIAL

## 2023-10-24 VITALS — SYSTOLIC BLOOD PRESSURE: 122 MMHG | DIASTOLIC BLOOD PRESSURE: 78 MMHG | HEART RATE: 88 BPM

## 2023-10-24 DIAGNOSIS — M54.42 CHRONIC LEFT-SIDED LOW BACK PAIN WITH LEFT-SIDED SCIATICA: ICD-10-CM

## 2023-10-24 DIAGNOSIS — G89.29 CHRONIC LEFT-SIDED LOW BACK PAIN WITH LEFT-SIDED SCIATICA: ICD-10-CM

## 2023-10-24 PROCEDURE — 97161 PT EVAL LOW COMPLEX 20 MIN: CPT | Performed by: PHYSICAL THERAPIST

## 2023-10-24 NOTE — PROGRESS NOTES
PT Evaluation     Today's date: 10/24/2023  Patient name: John Stroud  : 1965  MRN: [id number]  Referring provider: Shawnee Gallegos MD  Dx:   Encounter Diagnosis     ICD-10-CM    1. Chronic left-sided low back pain with left-sided sciatica  M54.42 Ambulatory referral to PT spine    G89.29           Start Time: 0810  Stop Time: 0840  Total time in clinic (min): 30 minutes    Assessment  Assessment details: Patient is a 60 y.o. female who presents to physical therapy with c/o chronic low back pain with left sided radiculopathy. Patient presents to evaluation with pain, decreased range of motion, decreased strength, and decreased tolerance to activity. Patient demonstrates good tolerance to treatment and was provided with a written copy of their initial home exercise program focusing on repeated/sustained lumbar flexion and was encouraged to perform daily per tolerance. I discussed risks, benefits, and alternatives to treatment, and answered all patient questions to patient satisfaction. Patient presents with baseline FOTO score of 28 indicating limited tolerance/ability to complete ADLs. Patient is an appropriate candidate for skilled PT and would benefit from skilled PT services to address the aforementioned impairments, achieve goals, maximize function, and improve quality of life. Pt is in agreement with this plan. Patient Education: activity modifications as needed, pacing of activities, importance of HEP compliance, PT prognosis/POC; Pt was educated on centralization versus peripheralization of symptoms, use of repeated/sustained movements to self-treat symptoms, importance of postural variability throughout the day and avoidance of prolonged postures, pacing and graded exposure principles, supine to standing transfers, and activity modifications to avoid provoking low back pain.       Impairments: abnormal or restricted ROM, activity intolerance, impaired physical strength, lacks appropriate home exercise program, pain with function, poor posture  and poor body mechanics  Barriers to therapy: Chronicity of sx    Goals  ST weeks  Pt will restore full and pain-free lumbar spine AROM to allow return to normal ADLs  Pt will demonstrate centralization of symptoms with repeated movements and/or traction of lumbar spine  Pt will decrease low back pain to 3-4/10  Pt will demonstrate good understanding and compliance with HEP   Pt will demonstrate improved postural awareness and ability to self-correct without reliance on external cues    LT weeks  Pt will demonstrate abolished radicular symptoms for 2 weeks  Pt will decrease low back pain to 1-2/10  Pt will exhibit proper squatting/lifting mechanics without reliance on external cues for correction of form    Pt will be able to tolerate prolonged standing/sitting/walking activities > 30 minutes without provocation of low back pain   Pt will improve FOTO score to > or = to 46 to indicate improved functional abilities       Plan  Patient would benefit from: PT eval and skilled physical therapy  Planned modality interventions: cryotherapy and thermotherapy: hydrocollator packs  Planned therapy interventions: activity modification, ADL training, body mechanics training, joint mobilization, manual therapy, nerve gliding, neuromuscular re-education, patient education, postural training, self care, strengthening, stretching, therapeutic activities, therapeutic exercise, home exercise program, graded exercise, graded activity, functional ROM exercises and flexibility  Frequency: 2x week  Duration in weeks: 8  Plan of Care beginning date: 10/24/2023  Plan of Care expiration date: 2023  Treatment plan discussed with: patient      Subjective Evaluation    History of Present Illness  Mechanism of injury: Pt reports to PT with c/o chronic LBP that has bothered her for > 10 years but felt like it became worse around the end of September causing her to go to the ED twice on 23 and 10/1/23 where she was given Toradol injection, muscle relaxers, and lidocaine patches with only minimal improvement in symptoms. Pt did have CT scan at ED that revealed a disc bulge at L3-4. Pt localizes her pain to left aspect of lumbar spine and radiating pain that travels to her left knee and is constant, feels like a burning, numbness, and tingling. Pt reports increased pain with prolonged walking/standing/sitting/lying, bending over, bed/car transfers, getting up from a chair. Pt finds temporary relief with heat and topical cream. Pt reports previous hx for lower back pain include physical therapy and pain management. Pt denies hx of unrelenting night pain, saddle anesthesia, unexplained weight loss, changes in B&B function, changes in balance/gait, recent fever, hx of IV drug use, prolonged corticosteroid use, hx of osteoporosis, recent trauma. Pt does have hx of breat cancer in  (treated with unilateral mastectomy). CT Scan of lumbar spine:   No acute compression collapse of the vetebra  Broad-based central protrusion at L4-5 with facet joint disease and moderate central canal narrowing. Disc bulge at L3-4 with the right subarticular recess protrusion with impingement of the traversing right L4 nerve root and moderate central canal narrowing. Aggravating factors: prolonged walking/standing/sitting/lying, bending over, bed/car transfers, getting up from a chair.  Pt finds temporary relief with heat and topical cream    Easing Factors: Rest, positional changes, heat, topical cream     PLOF:  Hx of low back pain > years    PMH: Hx of breast cancer in  (treated w/ unilateral mastectomy), migraines  Patient Goals  Patient goals for therapy: decreased pain, increased motion, increased strength, independence with ADLs/IADLs and return to sport/leisure activities    Pain  Current pain ratin  At best pain ratin  At worst pain rating: 10      Diagnostic Tests  CT scan: abnormal  Treatments  Current treatment: medication      Objective     General Comments:      Lumbar Comments  Pt ambulates unassisted with slow gait, minimal trunk rotation/arm swing     BP: 122/78 mmHg  Pulse: 88    Lumbar AROM:   Flexion: mod limitation (pain)  Extension: significant limitation (pain)  Left Side-bending: significant limitation (pain w/ peripheralization of sx to left anterior mid-thigh)  Right Side-bending: min limitation (left sided stretch)     Repeated lumbar flexion in standing: increased local low back pain, peripheralization of sx to left anterior hip  Repeated lumbar extension in standing: increased local low back pain, peripheralization of sx to left anterior mid-thigh  Repeated lumbar flexion in lying: abolished low back pain and radicular sx  Repeated lumbar extension in lying: increased local low back pain, peripheralization of sx to left anterior mid-thigh    Lumbar Traction: (+)  Long axis hip distraction (+)  SLR: (+) on left  Scour (-)  FADIR (-)    Lumbar PAIVMs: deferred 2* to time constraints    LE Dermatomes:  L1: Intact bilaterally but diminished on left  L2: Intact bilaterally but diminished on left  L3: Intact bilaterally but diminished on left  L4: Intact/symmetrical  L5: Intact/symmetrical   S1: Intact/symmetrical  S2: Intact/symmetrical     LE Myotomes:  Hip Flexion L2: Left 5-/5, Right 5/5  Knee Extension L3: Left 5-/5, Right 5/5  Ankle Dorsiflexion L4: Left 5-/5, Right 5/5  Hallux Extension L5: Left 5/5, Right 5/5  Ankle Plantarflexion S1: Left 5/5, Right 5/5  Knee Flexion S2: Left 5/5, Right 5/5    DTRs:  Patellar (L4): Left 2+; Right 2+  Achilles (S1): Left 1+; Right 1+    FOTO: 28             Diagnosis: Chronic LBP with left sided radiculopathy   Precautions: Hx of breast cancer in 2009 (treated w/ unilateral mastectomy), migraines   POC Expires: 12/19/23   Re-evaluation Date: 11/21/23   FOTO Scores/Date: Goal - 46; 10/24 - 28   Visit Count 1/10       Manuals 10/24 Long axis hip distraction on left NV       Lumbar traction NV               Ther Ex 10/24       SKTC 3x30"                                                                        Neuro Re-Ed                                                               Ther Act                                                                                 Modalities

## 2023-10-24 NOTE — LETTER
2023    Miller Roberts MD  [address] [place] [address]    Patient: Sergo Aden   YOB: 1965   Date of Visit: 10/24/2023     Encounter Diagnosis     ICD-10-CM    1. Chronic left-sided low back pain with left-sided sciatica  M54.42 Ambulatory referral to PT spine    G89.29           Dear Dr. Deidra Irwin: Thank you for your recent referral of Sergo Aden. Please review the attached evaluation summary from Stacy's recent visit. Please verify that you agree with the plan of care by signing the attached order. If you have any questions or concerns, please do not hesitate to call. I sincerely appreciate the opportunity to share in the care of one of your patients and hope to have another opportunity to work with you in the near future. Sincerely,    Gerardo Diamond, PT      Referring Provider:      I certify that I have read the below Plan of Care and certify the need for these services furnished under this plan of treatment while under my care. Miller Roberts MD  [address] [place] [address]  Via Fax: [phone]          PT Evaluation     Today's date: 10/24/2023  Patient name: Sergo Aden  : 1965  MRN: [id number]  Referring provider: Miller Roberts MD  Dx:   Encounter Diagnosis     ICD-10-CM    1. Chronic left-sided low back pain with left-sided sciatica  M54.42 Ambulatory referral to PT spine    G89.29           Start Time: 0810  Stop Time: 0840  Total time in clinic (min): 30 minutes    Assessment  Assessment details: Patient is a 60 y.o. female who presents to physical therapy with c/o chronic low back pain with left sided radiculopathy. Patient presents to evaluation with pain, decreased range of motion, decreased strength, and decreased tolerance to activity.  Patient demonstrates good tolerance to treatment and was provided with a written copy of their initial home exercise program focusing on repeated/sustained lumbar flexion and was encouraged to perform daily per tolerance. I discussed risks, benefits, and alternatives to treatment, and answered all patient questions to patient satisfaction. Patient presents with baseline FOTO score of 28 indicating limited tolerance/ability to complete ADLs. Patient is an appropriate candidate for skilled PT and would benefit from skilled PT services to address the aforementioned impairments, achieve goals, maximize function, and improve quality of life. Pt is in agreement with this plan. Patient Education: activity modifications as needed, pacing of activities, importance of HEP compliance, PT prognosis/POC; Pt was educated on centralization versus peripheralization of symptoms, use of repeated/sustained movements to self-treat symptoms, importance of postural variability throughout the day and avoidance of prolonged postures, pacing and graded exposure principles, supine to standing transfers, and activity modifications to avoid provoking low back pain.       Impairments: abnormal or restricted ROM, activity intolerance, impaired physical strength, lacks appropriate home exercise program, pain with function, poor posture  and poor body mechanics  Barriers to therapy: Chronicity of sx    Goals  ST weeks  Pt will restore full and pain-free lumbar spine AROM to allow return to normal ADLs  Pt will demonstrate centralization of symptoms with repeated movements and/or traction of lumbar spine  Pt will decrease low back pain to 3-4/10  Pt will demonstrate good understanding and compliance with HEP   Pt will demonstrate improved postural awareness and ability to self-correct without reliance on external cues    LT weeks  Pt will demonstrate abolished radicular symptoms for 2 weeks  Pt will decrease low back pain to 1-2/10  Pt will exhibit proper squatting/lifting mechanics without reliance on external cues for correction of form    Pt will be able to tolerate prolonged standing/sitting/walking activities > 30 minutes without provocation of low back pain   Pt will improve FOTO score to > or = to 46 to indicate improved functional abilities       Plan  Patient would benefit from: PT eval and skilled physical therapy  Planned modality interventions: cryotherapy and thermotherapy: hydrocollator packs  Planned therapy interventions: activity modification, ADL training, body mechanics training, joint mobilization, manual therapy, nerve gliding, neuromuscular re-education, patient education, postural training, self care, strengthening, stretching, therapeutic activities, therapeutic exercise, home exercise program, graded exercise, graded activity, functional ROM exercises and flexibility  Frequency: 2x week  Duration in weeks: 8  Plan of Care beginning date: 10/24/2023  Plan of Care expiration date: 12/19/2023  Treatment plan discussed with: patient      Subjective Evaluation    History of Present Illness  Mechanism of injury: Pt reports to PT with c/o chronic LBP that has bothered her for > 10 years but felt like it became worse around the end of September causing her to go to the ED twice on 9/25/23 and 10/1/23 where she was given Toradol injection, muscle relaxers, and lidocaine patches with only minimal improvement in symptoms. Pt did have CT scan at ED that revealed a disc bulge at L3-4. Pt localizes her pain to left aspect of lumbar spine and radiating pain that travels to her left knee and is constant, feels like a burning, numbness, and tingling. Pt reports increased pain with prolonged walking/standing/sitting/lying, bending over, bed/car transfers, getting up from a chair. Pt finds temporary relief with heat and topical cream. Pt reports previous hx for lower back pain include physical therapy and pain management.      Pt denies hx of unrelenting night pain, saddle anesthesia, unexplained weight loss, changes in B&B function, changes in balance/gait, recent fever, hx of IV drug use, prolonged corticosteroid use, hx of osteoporosis, recent trauma. Pt does have hx of breat cancer in 2009 (treated with unilateral mastectomy). CT Scan of lumbar spine:   No acute compression collapse of the vetebra  Broad-based central protrusion at L4-5 with facet joint disease and moderate central canal narrowing. Disc bulge at L3-4 with the right subarticular recess protrusion with impingement of the traversing right L4 nerve root and moderate central canal narrowing. Aggravating factors: prolonged walking/standing/sitting/lying, bending over, bed/car transfers, getting up from a chair.  Pt finds temporary relief with heat and topical cream    Easing Factors: Rest, positional changes, heat, topical cream     PLOF:  Hx of low back pain > years    PMH: Hx of breast cancer in 2009 (treated w/ unilateral mastectomy), migraines  Patient Goals  Patient goals for therapy: decreased pain, increased motion, increased strength, independence with ADLs/IADLs and return to sport/leisure activities    Pain  Current pain ratin  At best pain ratin  At worst pain rating: 10      Diagnostic Tests  CT scan: abnormal  Treatments  Current treatment: medication      Objective     General Comments:      Lumbar Comments  Pt ambulates unassisted with slow gait, minimal trunk rotation/arm swing     BP: 122/78 mmHg  Pulse: 88    Lumbar AROM:   Flexion: mod limitation (pain)  Extension: significant limitation (pain)  Left Side-bending: significant limitation (pain w/ peripheralization of sx to left anterior mid-thigh)  Right Side-bending: min limitation (left sided stretch)     Repeated lumbar flexion in standing: increased local low back pain, peripheralization of sx to left anterior hip  Repeated lumbar extension in standing: increased local low back pain, peripheralization of sx to left anterior mid-thigh  Repeated lumbar flexion in lying: abolished low back pain and radicular sx  Repeated lumbar extension in lying: increased local low back pain, peripheralization of sx to left anterior mid-thigh    Lumbar Traction: (+)  Long axis hip distraction (+)  SLR: (+) on left  Scour (-)  FADIR (-)    Lumbar PAIVMs: deferred 2* to time constraints    LE Dermatomes:  L1: Intact bilaterally but diminished on left  L2: Intact bilaterally but diminished on left  L3: Intact bilaterally but diminished on left  L4: Intact/symmetrical  L5: Intact/symmetrical   S1: Intact/symmetrical  S2: Intact/symmetrical     LE Myotomes:  Hip Flexion L2: Left 5-/5, Right 5/5  Knee Extension L3: Left 5-/5, Right 5/5  Ankle Dorsiflexion L4: Left 5-/5, Right 5/5  Hallux Extension L5: Left 5/5, Right 5/5  Ankle Plantarflexion S1: Left 5/5, Right 5/5  Knee Flexion S2: Left 5/5, Right 5/5    DTRs:  Patellar (L4): Left 2+; Right 2+  Achilles (S1): Left 1+; Right 1+    FOTO: 28             Diagnosis: Chronic LBP with left sided radiculopathy   Precautions: Hx of breast cancer in 2009 (treated w/ unilateral mastectomy), migraines   POC Expires: 12/19/23   Re-evaluation Date: 11/21/23   FOTO Scores/Date: Goal - 46; 10/24 - 28   Visit Count 1/10       Manuals 10/24       Long axis hip distraction on left NV       Lumbar traction NV               Ther Ex 10/24       SKTC 3x30"                                                                        Neuro Re-Ed                                                               Ther Act                                                                                 Modalities

## 2023-10-31 ENCOUNTER — OFFICE VISIT (OUTPATIENT)
Dept: PHYSICAL THERAPY | Facility: CLINIC | Age: 58
End: 2023-10-31
Payer: COMMERCIAL

## 2023-10-31 DIAGNOSIS — M54.42 CHRONIC LEFT-SIDED LOW BACK PAIN WITH LEFT-SIDED SCIATICA: Primary | ICD-10-CM

## 2023-10-31 DIAGNOSIS — G89.29 CHRONIC LEFT-SIDED LOW BACK PAIN WITH LEFT-SIDED SCIATICA: Primary | ICD-10-CM

## 2023-10-31 PROCEDURE — 97110 THERAPEUTIC EXERCISES: CPT | Performed by: PHYSICAL THERAPIST

## 2023-10-31 PROCEDURE — 97140 MANUAL THERAPY 1/> REGIONS: CPT | Performed by: PHYSICAL THERAPIST

## 2023-10-31 NOTE — PROGRESS NOTES
Daily Note     Today's date: 10/31/2023  Patient name: Felice Quach  : 1965  MRN: [id number]  Referring provider: Edilson Sarabia MD  Dx:   Encounter Diagnosis     ICD-10-CM    1. Chronic left-sided low back pain with left-sided sciatica  M54.42     G89.29           Start Time: 1230  Stop Time: 1300  Total time in clinic (min): 30 minutes    Subjective: Pt reports good compliance with home exercises, rates current pain 5/10. Objective: See treatment diary below      Assessment: Pt responds favorably to manual interventions and able to fully abolish lower back pain and left sided leg symptoms, followed up with [facility] with good tolerance, however requires modified ROM as end range hip flexion provokes mild left groin pain but when modified doesn't provoke hip pain. Introduced additional lumbar ROM exercises and sciatic glides in supine with good tolerance and no provocation of pain. Pt maintains 0/10 post tx with improved quality of gait post session, encouraged to continue with home exercises and will progress as able next visit. Plan: Continue per plan of care. Progress treatment as tolerated.        Diagnosis: Chronic LBP with left sided radiculopathy   Precautions: Hx of breast cancer in  (treated w/ unilateral mastectomy), migraines   POC Expires: 23   Re-evaluation Date: 23   FOTO Scores/Date: Goal - 55; 10/24 -    Visit Count 1/10 2/10      Manuals 10/24 10/31      Long axis hip distraction on left NV KD      Lumbar traction NV KD              Ther Ex 10/24 10/31      SKTC 3x30"  3x30"       LTR  10x3"       Sciatic glides in supine  20x      Seated ball roll  10x5" fwd and to right                                      HEP  Creation/instruction      Neuro Re-Ed                                                               Ther Act                                                                                 Modalities

## 2023-11-02 ENCOUNTER — OFFICE VISIT (OUTPATIENT)
Dept: PHYSICAL THERAPY | Facility: CLINIC | Age: 58
End: 2023-11-02
Payer: COMMERCIAL

## 2023-11-02 DIAGNOSIS — M54.42 CHRONIC LEFT-SIDED LOW BACK PAIN WITH LEFT-SIDED SCIATICA: Primary | ICD-10-CM

## 2023-11-02 DIAGNOSIS — G89.29 CHRONIC LEFT-SIDED LOW BACK PAIN WITH LEFT-SIDED SCIATICA: Primary | ICD-10-CM

## 2023-11-02 PROCEDURE — 97112 NEUROMUSCULAR REEDUCATION: CPT | Performed by: PHYSICAL THERAPIST

## 2023-11-02 PROCEDURE — 97140 MANUAL THERAPY 1/> REGIONS: CPT | Performed by: PHYSICAL THERAPIST

## 2023-11-02 PROCEDURE — 97110 THERAPEUTIC EXERCISES: CPT | Performed by: PHYSICAL THERAPIST

## 2023-11-02 NOTE — PROGRESS NOTES
Daily Note     Today's date: 2023  Patient name: Jamir Pulliam  : 1965  MRN: [id number]  Referring provider: Abe Honeycutt MD  Dx:   Encounter Diagnosis     ICD-10-CM    1. Chronic left-sided low back pain with left-sided sciatica  M54.42     G89.29           Start Time: 09  Stop Time: 1004  Total time in clinic (min): 39 minutes    Subjective: Pt reports feeling good after last visit, had some increase in pain yesterday but not as intense. Rates current pain 3/10 with some numbness radiating into left thigh. Objective: See treatment diary below      Assessment: Pt continues to progress well with manual interventions with ability to fully abolish local low back pain and left sided numbness. Good recall of ROM and nerve glide exercises without provocation of low back pain. Initiated mat table core strengthening with good tolerance but extensive cues required for proper mm activation and breathing strategies with moderate c/o leg/abdominal fatigue but denies pain or peripheralization of symptoms. Attempted to progress standing strength with pallof press but did note mild peripheralization of left sided symptoms and opted to hold after 10 reps and repeated lumbar traction post tx to abolish symptoms and able to leave without pain or radicular sx post tx. HEP was updated and reviewed, will progress next visit as symptoms allow. Plan: Continue per plan of care. Progress treatment as tolerated.        Diagnosis: Chronic LBP with left sided radiculopathy   Precautions: Hx of breast cancer in  (treated w/ unilateral mastectomy), migraines   POC Expires: 23   Re-evaluation Date: 23   FOTO Scores/Date: Goal - 55; 10/24 -    Visit Count 1/10 2/10 3/10     Manuals 10/24 10/31 11/2     Long axis hip distraction on left NV KD KD     Lumbar traction NV KD KD pre and post tx             Ther Ex 10/24 10/31 11/2     SKTC 3x30"  3x30"  3x30"     LTR  10x3"  10x3"      Sciatic glides in supine  20x 20x     Seated ball roll  10x5" fwd and to right 10x5" fwd and to right                                     HEP  Creation/instruction Updated      Neuro Re-Ed   11/2     TA isometrics   5x10"      Abd bracing w/ mini march   20x alt     Abd bracing w/ SLR   10x ea     Bridge w/ band   10x grn     Pallof press   single grn 10x ea                    Ther Act                                                                                 Modalities

## 2023-11-07 ENCOUNTER — OFFICE VISIT (OUTPATIENT)
Dept: PHYSICAL THERAPY | Facility: CLINIC | Age: 58
End: 2023-11-07
Payer: COMMERCIAL

## 2023-11-07 DIAGNOSIS — G89.29 CHRONIC LEFT-SIDED LOW BACK PAIN WITH LEFT-SIDED SCIATICA: Primary | ICD-10-CM

## 2023-11-07 DIAGNOSIS — M54.42 CHRONIC LEFT-SIDED LOW BACK PAIN WITH LEFT-SIDED SCIATICA: Primary | ICD-10-CM

## 2023-11-07 PROCEDURE — 97112 NEUROMUSCULAR REEDUCATION: CPT | Performed by: PHYSICAL THERAPIST

## 2023-11-07 PROCEDURE — 97110 THERAPEUTIC EXERCISES: CPT | Performed by: PHYSICAL THERAPIST

## 2023-11-07 PROCEDURE — 97140 MANUAL THERAPY 1/> REGIONS: CPT | Performed by: PHYSICAL THERAPIST

## 2023-11-07 NOTE — PROGRESS NOTES
Daily Note     Today's date: 2023  Patient name: Vidal Hurd  : 1965  MRN: [id number]  Referring provider: Kimmie Sullivan MD  Dx:   Encounter Diagnosis     ICD-10-CM    1. Chronic left-sided low back pain with left-sided sciatica  M54.42     G89.29           Start Time: 0847  Stop Time: 09  Total time in clinic (min): 43 minutes    Subjective: Pt reports overall improvement since enrolling in therapy, reports minimal pain of 2/10 currently. Objective: See treatment diary below      Assessment: Pt continues to respond well to manual interventions with good ability to abolish pain symptoms. Pt reliant on cues throughout mat table exercises for proper form/sequencing without any pain, only reports of muscle fatigue/soreness with strengthening exercises which were able to be progressed in volume today. Added sit to stands with good tolerance but cues to maintain neutral spine and avoid rounding of lumbar spine which tends to occur at bottom portion of squat but able to correct with cues and without provoking pain. Pt remains with mild left sided leg symptoms provoked with pallof press but less intense today compared to previous session and abolish with traction post tx, rating post tx pain 0/10. Will continue to progress next visit as symptoms allow. Plan: Continue per plan of care. Progress treatment as tolerated.        Diagnosis: Chronic LBP with left sided radiculopathy   Precautions: Hx of breast cancer in  (treated w/ unilateral mastectomy), migraines   POC Expires: 23   Re-evaluation Date: 23   FOTO Scores/Date: Goal - 55; 10/24 -    Visit Count 1/10 2/10 3/10 4/10    Manuals 10/24 10/31 11/2 11/7    Long axis hip distraction on left NV KD KD KD    Lumbar traction NV KD KD pre and post tx KD pre and post tx            Ther Ex 10/24 10/31 11/2 11/7    SKTC 3x30"  3x30"  3x30" 3x30" ea    LTR  10x3"  10x3"  10x3"     Sciatic glides in supine  20x 20x 20x     Seated ball roll  10x5" fwd and to right 10x5" fwd and to right 10x5" fwd and to right     Sit to stand    10# 2x10 to low mat                            HEP  Creation/instruction Updated  Updated     Neuro Re-Ed   11/2 11/7    TA isometrics   5x10"  5x10"     Abd bracing w/ mini march   20x alt 20x alt    Abd bracing w/ SLR   10x ea 2x10 ea    Bridge w/ band   10x grn 2x10 grn     Pallof press   single grn 10x ea Single grn 20x ea                   Ther Act                                                                                 Modalities

## 2023-11-09 ENCOUNTER — OFFICE VISIT (OUTPATIENT)
Dept: PHYSICAL THERAPY | Facility: CLINIC | Age: 58
End: 2023-11-09
Payer: COMMERCIAL

## 2023-11-09 DIAGNOSIS — G89.29 CHRONIC LEFT-SIDED LOW BACK PAIN WITH LEFT-SIDED SCIATICA: Primary | ICD-10-CM

## 2023-11-09 DIAGNOSIS — M54.42 CHRONIC LEFT-SIDED LOW BACK PAIN WITH LEFT-SIDED SCIATICA: Primary | ICD-10-CM

## 2023-11-09 PROCEDURE — 97112 NEUROMUSCULAR REEDUCATION: CPT | Performed by: PHYSICAL THERAPIST

## 2023-11-09 PROCEDURE — 97110 THERAPEUTIC EXERCISES: CPT | Performed by: PHYSICAL THERAPIST

## 2023-11-09 PROCEDURE — 97140 MANUAL THERAPY 1/> REGIONS: CPT | Performed by: PHYSICAL THERAPIST

## 2023-11-14 ENCOUNTER — OFFICE VISIT (OUTPATIENT)
Dept: PHYSICAL THERAPY | Facility: CLINIC | Age: 58
End: 2023-11-14
Payer: COMMERCIAL

## 2023-11-14 DIAGNOSIS — M54.42 CHRONIC LEFT-SIDED LOW BACK PAIN WITH LEFT-SIDED SCIATICA: Primary | ICD-10-CM

## 2023-11-14 DIAGNOSIS — G89.29 CHRONIC LEFT-SIDED LOW BACK PAIN WITH LEFT-SIDED SCIATICA: Primary | ICD-10-CM

## 2023-11-14 PROCEDURE — 97110 THERAPEUTIC EXERCISES: CPT | Performed by: PHYSICAL THERAPIST

## 2023-11-14 PROCEDURE — 97140 MANUAL THERAPY 1/> REGIONS: CPT | Performed by: PHYSICAL THERAPIST

## 2023-11-14 PROCEDURE — 97112 NEUROMUSCULAR REEDUCATION: CPT | Performed by: PHYSICAL THERAPIST

## 2023-11-14 NOTE — PROGRESS NOTES
Daily Note     Today's date: 2023  Patient name: Pam Dial  : 1965  MRN: [id number]  Referring provider: Delma Arroyo MD  Dx:   Encounter Diagnosis     ICD-10-CM    1. Chronic left-sided low back pain with left-sided sciatica  M54.42     G89.29           Start Time: 0930  Stop Time: 1015  Total time in clinic (min): 45 minutes    Subjective: Pt reports current pain 1/10, overall feels she is progressing well in therapy. Objective: See treatment diary below      Assessment: Pt continues to respond well to manual tx and able to fully abolish pain and left sided radicular symptoms. Pt with good tolerance with mat table strengthening but remains reliant on cues for proper form and eccentric control, no pain reported but notable muscle fatigue with bridges and SLRs. Pt still with provocation of left sided radicular symptoms with pallof press when performed to her left but much less intense and doesn't travel as distally as it used to and dissipates immediately after completing exercise. Progressed functional strength with addition of unilateral farmer carries with extensive cues for postural correction and form but able to complete without pain, only fatigue. Pt reports 0/10 pain post tx, will continue to progress next visit as able. Plan: Continue per plan of care. Progress treatment as tolerated.        Diagnosis: Chronic LBP with left sided radiculopathy   Precautions: Hx of breast cancer in  (treated w/ unilateral mastectomy), migraines   POC Expires: 23   Re-evaluation Date: 23   FOTO Scores/Date: Goal - 55; 10/24 -    Visit Count 6/10 2/10 3/10 4/10 5/10   Manuals 11/14 10/31 11/2 11/7 11/9   Long axis hip distraction on left KD KD KD KD KD   Lumbar traction KD KD KD pre and post tx KD pre and post tx KD pre and post tx           Ther Ex 11/14 10/31 11/2 11/7 11/9   SKTC 3x30"  3x30"  3x30" 3x30" ea 3x30"    LTR 10x3"  10x3"  10x3"  10x3"  10x3"   Sciatic glides in supine 20x  20x 20x 20x  20x   Seated ball roll 10x5" fwd and to right  10x5" fwd and to right 10x5" fwd and to right 10x5" fwd and to right  10x5" fwd and to right   Sit to stand 10# 2x10 to low mat   10# 2x10 to low mat 10# 2x10 to low mat   Lateral band walk Grn at ankle 4 laps    Grn at ankle 4 laps                   HEP Updated  Creation/instruction Updated  Updated  Updated    Neuro Re-Ed 11/14 11/2 11/7 11/9   TA isometrics 5x10"   5x10"  5x10"  5x10"    Abd bracing w/ mini march 20x alt 2#  20x alt 20x alt 20x alt 2#   Abd bracing w/ SLR 2x10 ea 2#  10x ea 2x10 ea 2x10 ea 2#   Bridge w/ band 2x10 grn   10x grn 2x10 grn  2x10 grn   Pallof press Single grn 10x ea  single grn 10x ea Single grn 20x ea Single grn 20x ea    Unilateral farmer carry 5# 3 laps ea                      Ther Act                                                                                 Modalities

## 2023-11-16 ENCOUNTER — OFFICE VISIT (OUTPATIENT)
Dept: PHYSICAL THERAPY | Facility: CLINIC | Age: 58
End: 2023-11-16
Payer: COMMERCIAL

## 2023-11-16 DIAGNOSIS — G89.29 CHRONIC LEFT-SIDED LOW BACK PAIN WITH LEFT-SIDED SCIATICA: Primary | ICD-10-CM

## 2023-11-16 DIAGNOSIS — M54.42 CHRONIC LEFT-SIDED LOW BACK PAIN WITH LEFT-SIDED SCIATICA: Primary | ICD-10-CM

## 2023-11-16 PROCEDURE — 97112 NEUROMUSCULAR REEDUCATION: CPT | Performed by: PHYSICAL THERAPIST

## 2023-11-16 PROCEDURE — 97110 THERAPEUTIC EXERCISES: CPT | Performed by: PHYSICAL THERAPIST

## 2023-11-16 PROCEDURE — 97140 MANUAL THERAPY 1/> REGIONS: CPT | Performed by: PHYSICAL THERAPIST

## 2023-11-16 NOTE — PROGRESS NOTES
Daily Note     Today's date: 2023  Patient name: Laure Alvarado  : 1965  MRN: [id number]  Referring provider: Arya Arias MD  Dx:   Encounter Diagnosis     ICD-10-CM    1. Chronic left-sided low back pain with left-sided sciatica  M54.42     G89.29           Start Time: 1015  Stop Time: 1100  Total time in clinic (min): 45 minutes    Subjective: Pt reports back felt good after last visit, minimal pain of 1/10 currently. Objective: See treatment diary below      Assessment: Pt continues to experience full pain relief with manual interventions and flexion based lumbar movements. Attempted to increase weight with mini marches and SLRs but unable due to weakness/fatigue and returned to 2# which remains fatiguing and without increase in low back pain. Increased weight with sit to stands with initial cues to avoid rounding of lumbar spine at bottom of squat without provoking back pain. Pt was able to complete unilateral farmer carries without onset of left sided radicular symptoms but does remain easily fatigued with current weight. Pt reports post-tx pain to 0/10 and was encouraged to continue with HEP daily. Pt to have re-assessment next visit. Plan: Continue per plan of care. Progress treatment as tolerated.        Diagnosis: Chronic LBP with left sided radiculopathy   Precautions: Hx of breast cancer in  (treated w/ unilateral mastectomy), migraines   POC Expires: 23   Re-evaluation Date: 23   FOTO Scores/Date: Goal - 55; 10/24 -    Visit Count 6/10 7/10 3/10 4/10 5/10   Manuals    Long axis hip distraction on left KD KD KD KD KD   Lumbar traction KD KD KD pre and post tx KD pre and post tx KD pre and post tx           Ther Ex    SKTC 3x30"  3x30"  3x30" 3x30" ea 3x30"    LTR 10x3"  10x3"  10x3"  10x3"  10x3"   Sciatic glides in supine 20x  20x 20x 20x  20x   Seated ball roll 10x5" fwd and to right  10x5" fwd and to right 10x5" fwd and to right 10x5" fwd and to right  10x5" fwd and to right   Sit to stand 10# 2x10 to low mat 15# 2x10 to low mat  10# 2x10 to low mat 10# 2x10 to low mat   Lateral band walk Grn at ankle 4 laps Grn at ankle 4 laps   Grn at ankle 4 laps                   HEP Updated  Updated  Updated  Updated  Updated    Neuro Re-Ed 11/14 11/16 11/2 11/7 11/9   TA isometrics 5x10"  5x10"  5x10"  5x10"  5x10"    Abd bracing w/ mini march 20x alt 2# 20x alt 2# 20x alt 20x alt 20x alt 2#   Abd bracing w/ SLR 2x10 ea 2# 2x10 ea 2# 10x ea 2x10 ea 2x10 ea 2#   Bridge w/ band 2x10 grn  2x10 grn 10x grn 2x10 grn  2x10 grn   Pallof press Single grn 10x ea Single grn 20x ea single grn 10x ea Single grn 20x ea Single grn 20x ea    Unilateral farmer carry 5# 3 laps ea 5# 3 laps ea                     Ther Act                                                                                 Modalities

## 2023-11-21 ENCOUNTER — EVALUATION (OUTPATIENT)
Dept: PHYSICAL THERAPY | Facility: CLINIC | Age: 58
End: 2023-11-21
Payer: COMMERCIAL

## 2023-11-21 DIAGNOSIS — G89.29 CHRONIC LEFT-SIDED LOW BACK PAIN WITH LEFT-SIDED SCIATICA: Primary | ICD-10-CM

## 2023-11-21 DIAGNOSIS — M54.42 CHRONIC LEFT-SIDED LOW BACK PAIN WITH LEFT-SIDED SCIATICA: Primary | ICD-10-CM

## 2023-11-21 PROCEDURE — 97110 THERAPEUTIC EXERCISES: CPT | Performed by: PHYSICAL THERAPIST

## 2023-11-21 NOTE — PROGRESS NOTES
PT Discharge    Today's date: 2023  Patient name: Adrienne Thomas  : 1965  MRN: [id number]  Referring provider: Pau Lares MD  Dx:   Encounter Diagnosis     ICD-10-CM    1. Chronic left-sided low back pain with left-sided sciatica  M54.42     G89.29           Start Time: 1225  Stop Time: 1250  Total time in clinic (min): 25 minutes    Assessment  Assessment details: Pt has been seen for 8 visits and has made excellent subjective/objective improvements since enrolling in therapy with improved FOTO score from 28 to 76 since initial evaluation. Pt has returned all normal ADLs and recreational activities without pain or limitation and is appropriate for discharge to home exercise program at this time. Pt encouraged to continue with HEP on regular basis per tolerance and was educated on how to progress/regress exercises per symptoms/tolerance. Pt is in agreement with plan.       Barriers to therapy:      Goals  ST weeks  Pt will restore full and pain-free lumbar spine AROM to allow return to normal ADLs NEARLY MET  Pt will demonstrate centralization of symptoms with repeated movements and/or traction of lumbar spine MET  Pt will decrease low back pain to 3-4/10 MET  Pt will demonstrate good understanding and compliance with HEP MET  Pt will demonstrate improved postural awareness and ability to self-correct without reliance on external cues MET    LT weeks  Pt will demonstrate abolished radicular symptoms for 2 weeks MET  Pt will decrease low back pain to 1-2/10 MET  Pt will exhibit proper squatting/lifting mechanics without reliance on external cues for correction of form  MET  Pt will be able to tolerate prolonged standing/sitting/walking activities > 30 minutes without provocation of low back pain  MET  Pt will improve FOTO score to > or = to 46 to indicate improved functional abilities  MET      Plan  Plan details: Discharge to HEP  Patient would benefit from: PT eval and skilled physical therapy  Planned modality interventions: cryotherapy and thermotherapy: hydrocollator packs  Planned therapy interventions: activity modification, ADL training, body mechanics training, joint mobilization, manual therapy, nerve gliding, neuromuscular re-education, patient education, postural training, self care, strengthening, stretching, therapeutic activities, therapeutic exercise, home exercise program, graded exercise, graded activity, functional ROM exercises and flexibility  Plan of Care beginning date: 10/24/2023  Plan of Care expiration date: 12/19/2023  Treatment plan discussed with: patient      Subjective Evaluation    History of Present Illness  Mechanism of injury: DISCHARGE (11/21/23): Pt has completed 8 visits to date and overall feels significant improvement in her pain and radicular symptoms. Pt reports she still has some discomfort with prolonged walking/sitting and heavy lifting but overall feels she is doing all daily activities without pain. Pt feels she is ready to transition to home program at this time. IE:  Pt reports to PT with c/o chronic LBP that has bothered her for > 10 years but felt like it became worse around the end of September causing her to go to the ED twice on 9/25/23 and 10/1/23 where she was given Toradol injection, muscle relaxers, and lidocaine patches with only minimal improvement in symptoms. Pt did have CT scan at ED that revealed a disc bulge at L3-4. Pt localizes her pain to left aspect of lumbar spine and radiating pain that travels to her left knee and is constant, feels like a burning, numbness, and tingling. Pt reports increased pain with prolonged walking/standing/sitting/lying, bending over, bed/car transfers, getting up from a chair. Pt finds temporary relief with heat and topical cream. Pt reports previous hx for lower back pain include physical therapy and pain management.      Pt denies hx of unrelenting night pain, saddle anesthesia, unexplained weight loss, changes in B&B function, changes in balance/gait, recent fever, hx of IV drug use, prolonged corticosteroid use, hx of osteoporosis, recent trauma. Pt does have hx of breat cancer in 2009 (treated with unilateral mastectomy). CT Scan of lumbar spine:   No acute compression collapse of the vetebra  Broad-based central protrusion at L4-5 with facet joint disease and moderate central canal narrowing. Disc bulge at L3-4 with the right subarticular recess protrusion with impingement of the traversing right L4 nerve root and moderate central canal narrowing. Aggravating factors: prolonged walking/standing/sitting/lying, bending over, bed/car transfers, getting up from a chair. Pt finds temporary relief with heat and topical cream    Easing Factors: Rest, positional changes, heat, topical cream     PLOF:  Hx of low back pain > years    PMH: Hx of breast cancer in  (treated w/ unilateral mastectomy), migraines  Pain  Current pain ratin  At best pain ratin  At worst pain ratin  Quality: dull ache      Diagnostic Tests  CT scan: abnormal      Objective     General Comments:      Lumbar Comments  Pt ambulates unassisted with normal gait mechanics     Lumbar AROM:   Flexion: WNL  Extension: moderate limitation (stiff)  Left Side-bending:  WNL  Right Side-bending: WNL    Lumbar Traction: (+)  Long axis hip distraction (+)  SLR: (-)  Scour (-)  FADIR (-)    LE Dermatomes:  L1: Intact/symmetrical  L2: Intact/symmetrical  L3: Intact/symmetrical  L4: Intact/symmetrical  L5: Intact/symmetrical   S1: Intact/symmetrical  S2: Intact/symmetrical     LE Myotomes:  Hip Flexion L2: Left 5/5, Right 5/5  Knee Extension L3: Left 5/5, Right 5/5  Ankle Dorsiflexion L4: Left 5/5, Right 5/5  Hallux Extension L5: Left 5/5, Right 5/5  Ankle Plantarflexion S1: Left 5/5, Right 5/5  Knee Flexion S2: Left 5/5, Right 5/5    FOTO: 76 (improved from 28 at IE)             Diagnosis: Chronic LBP with left sided radiculopathy Precautions: Hx of breast cancer in 2009 (treated w/ unilateral mastectomy), migraines   POC Expires: 12/19/23   Re-evaluation Date: 11/21/23   FOTO Scores/Date: Goal - 55; 10/24 - 28; 11/21 - 76   Visit Count 6/10 7/10 1/10 4/10 5/10   Manuals 11/14 11/16 11/21 11/7 11/9   Long axis hip distraction on left KD KD  KD KD   Lumbar traction KD KD  KD pre and post tx KD pre and post tx           Ther Ex 11/14 11/16 11/21 11/7 11/9   SKTC 3x30"  3x30"   3x30" ea 3x30"    LTR 10x3"  10x3"   10x3"  10x3"   Sciatic glides in supine 20x  20x  20x  20x   Seated ball roll 10x5" fwd and to right  10x5" fwd and to right  10x5" fwd and to right  10x5" fwd and to right   Sit to stand 10# 2x10 to low mat 15# 2x10 to low mat  10# 2x10 to low mat 10# 2x10 to low mat   Lateral band walk Grn at ankle 4 laps Grn at ankle 4 laps   Grn at ankle 4 laps                   HEP Updated  Updated  Re-assessed lumbar spine and FOTO; HEP creation and instruction, educated on importance of continued HEP compliance and how to progress/regress exercises as needed Updated  Updated    Neuro Re-Ed 11/14 11/16 11/21 11/7 11/9   TA isometrics 5x10"  5x10"   5x10"  5x10"    Abd bracing w/ mini march 20x alt 2# 20x alt 2#  20x alt 20x alt 2#   Abd bracing w/ SLR 2x10 ea 2# 2x10 ea 2#  2x10 ea 2x10 ea 2#   Bridge w/ band 2x10 grn  2x10 grn  2x10 grn  2x10 grn   Pallof press Single grn 10x ea Single grn 20x ea  Single grn 20x ea Single grn 20x ea    Unilateral farmer carry 5# 3 laps ea 5# 3 laps ea                     Ther Act                                                                                 Modalities